# Patient Record
Sex: FEMALE | Race: WHITE | NOT HISPANIC OR LATINO | Employment: FULL TIME | URBAN - METROPOLITAN AREA
[De-identification: names, ages, dates, MRNs, and addresses within clinical notes are randomized per-mention and may not be internally consistent; named-entity substitution may affect disease eponyms.]

---

## 2023-10-10 DIAGNOSIS — Z30.41 ENCOUNTER FOR SURVEILLANCE OF CONTRACEPTIVE PILLS: Primary | ICD-10-CM

## 2023-10-10 RX ORDER — NORETHINDRONE ACETATE AND ETHINYL ESTRADIOL AND FERROUS FUMARATE 1MG-20(21)
1 KIT ORAL DAILY
Qty: 28 TABLET | Refills: 3 | Status: SHIPPED | OUTPATIENT
Start: 2023-10-10 | End: 2023-11-03

## 2023-11-02 DIAGNOSIS — Z30.41 ENCOUNTER FOR SURVEILLANCE OF CONTRACEPTIVE PILLS: ICD-10-CM

## 2023-11-03 RX ORDER — NORETHINDRONE ACETATE AND ETHINYL ESTRADIOL AND FERROUS FUMARATE 1MG-20(21)
1 KIT ORAL DAILY
Qty: 84 TABLET | Refills: 0 | Status: SHIPPED | OUTPATIENT
Start: 2023-11-03 | End: 2024-02-05

## 2024-02-01 DIAGNOSIS — Z30.41 ENCOUNTER FOR SURVEILLANCE OF CONTRACEPTIVE PILLS: ICD-10-CM

## 2024-02-05 RX ORDER — NORETHINDRONE ACETATE AND ETHINYL ESTRADIOL AND FERROUS FUMARATE 1MG-20(21)
1 KIT ORAL DAILY
Qty: 84 TABLET | Refills: 0 | Status: SHIPPED | OUTPATIENT
Start: 2024-02-05

## 2024-04-28 DIAGNOSIS — Z30.41 ENCOUNTER FOR SURVEILLANCE OF CONTRACEPTIVE PILLS: ICD-10-CM

## 2024-05-01 RX ORDER — NORETHINDRONE ACETATE AND ETHINYL ESTRADIOL AND FERROUS FUMARATE 1MG-20(21)
1 KIT ORAL DAILY
Qty: 84 TABLET | Refills: 0 | OUTPATIENT
Start: 2024-05-01

## 2024-05-28 ENCOUNTER — OFFICE VISIT (OUTPATIENT)
Dept: OBSTETRICS AND GYNECOLOGY | Facility: CLINIC | Age: 34
End: 2024-05-28
Payer: COMMERCIAL

## 2024-05-28 VITALS
HEIGHT: 66 IN | SYSTOLIC BLOOD PRESSURE: 124 MMHG | BODY MASS INDEX: 21.76 KG/M2 | WEIGHT: 135.4 LBS | DIASTOLIC BLOOD PRESSURE: 64 MMHG

## 2024-05-28 DIAGNOSIS — Z12.4 CERVICAL CANCER SCREENING: ICD-10-CM

## 2024-05-28 DIAGNOSIS — Z01.419 ENCOUNTER FOR WELL WOMAN EXAM WITH ROUTINE GYNECOLOGICAL EXAM: Primary | ICD-10-CM

## 2024-05-28 PROCEDURE — 1036F TOBACCO NON-USER: CPT | Performed by: OBSTETRICS & GYNECOLOGY

## 2024-05-28 PROCEDURE — 88175 CYTOPATH C/V AUTO FLUID REDO: CPT

## 2024-05-28 PROCEDURE — 99395 PREV VISIT EST AGE 18-39: CPT | Performed by: OBSTETRICS & GYNECOLOGY

## 2024-05-28 PROCEDURE — 87624 HPV HI-RISK TYP POOLED RSLT: CPT

## 2024-05-28 RX ORDER — ALBUTEROL SULFATE 90 UG/1
AEROSOL, METERED RESPIRATORY (INHALATION)
COMMUNITY
Start: 2020-03-27

## 2024-05-28 RX ORDER — TRIAMCINOLONE ACETONIDE 1 MG/G
1 CREAM TOPICAL 2 TIMES DAILY
COMMUNITY
Start: 2024-05-08 | End: 2024-06-07

## 2024-05-28 RX ORDER — MONTELUKAST SODIUM 10 MG/1
10 TABLET ORAL NIGHTLY
COMMUNITY

## 2024-05-28 RX ORDER — FLUTICASONE PROPIONATE AND SALMETEROL 100; 50 UG/1; UG/1
1 POWDER RESPIRATORY (INHALATION) 2 TIMES DAILY
COMMUNITY
Start: 2024-04-25

## 2024-05-28 RX ORDER — FLUTICASONE PROPIONATE 50 MCG
1 SPRAY, SUSPENSION (ML) NASAL DAILY
COMMUNITY

## 2024-05-28 RX ORDER — LORATADINE 10 MG/1
10 TABLET ORAL
COMMUNITY

## 2024-05-28 ASSESSMENT — ENCOUNTER SYMPTOMS: GASTROINTESTINAL NEGATIVE: 0

## 2024-05-28 NOTE — PROGRESS NOTES
PAP 10- NEG, HPV NEG  10-29-21 ASCUS, HPV NEG, 9-29-20 LSIL HPV NEG, COLPO 10-30-20 CAN'T R/O LGSIL,   9-24-19 NEG HPV NEG, 8-24-18 NEG HPV NEG,   2017 HPV + h/o Colpo and LEEP in 2016&2017 in [place]  MAMMO NEVER  DEXA NEVER  COLON NEVER    ASSESSMENT/PLAN    Encounter for well woman exam with routine gynecological exam    Routine well woman visit.   Your exam was normal today.   Prior h/o abnormal pap and LEEP procedure.   A pap and HPV test was done. If you are connected with the  on line system, you will be notified about your pap results through the patient portal in 2-3 weeks.          SUBJECTIVE    HPI    35 yo G0 here for a well woman visit.  Last visit 10/2022 for RA.  Recent rash. Went to PCP. Had blood work, positive CHARLENE. Has appt to see rheumatology.   Denies any other intervening medical or surgical issues.   h/o abnormal pap with procedures as above. Last pap 2022 was normal and HPV negative.  Went off of OCP's in the last year. Buzz lives in [facility] so decided to come off OC's. Buzz approved to come to the [facility] as of July 1. Plans condom use.   In last 6 months gets cycle every 1 - 1 1/2 months. Notes irregular cycles prior to OC's. We reviewed other contraceptive options. She will consider and call if she decides on a contraceptive method.   Non smoker,  no ETOH.      REVIEW OF SYSTEMS    Constitutional: no fever, no chills, no recent weight gain, no recent weight loss, no fatigue.   Eyes: no eye pain, no vision problems, no dryness of the eyes.   ENT: no hearing loss, + nosebleeds, no sinus congestion.   Cardiovascular: no chest pain, + palpitations.  Respiratory: + shortness of breath, + cough, no wheezing.   Gastrointestinal: no abdominal pain, no constipation, no nausea, no diarrhea, no vomiting.   Genitourinary: no pelvic pain, no dysuria, no urinary incontinence, no change in urinary frequency, no vaginal dryness, no vaginal itching,  no vaginal discharge, no unexplained  "vaginal bleeding, no lesion/sore.   Musculoskeletal: no back pain, no joint swelling and no leg edema.   Integumentary: no rashes, no skin lesions, no breast pain, no nipple discharge, no breast lump.   Neurological: no headache, no numbness, no dizziness.   Psychiatric: no sleep disturbances, no anxiety, no depression.   Endocrine: no hot flashes, no loss of hair, no hirsutism.   Hematologic/Lymphatic: no swollen glands, no tendency for easy bleeding,  no tendency for easy bruising.      OBJECTIVE    /64   Ht 1.676 m (5' 6\")   Wt 61.4 kg (135 lb 6.4 oz)   LMP 05/03/2024 (Exact Date)   BMI 21.85 kg/m²      Physical Exam     Constitutional: Alert and in no acute distress. Well developed, well nourished   Head and Face: Head and face: normal   Eyes: Normal external exam - nonicteric sclera, extraocular movements intact (EOMI) and no ptosis.   Ears, Nose, Mouth, and Throat: External inspection of ears and nose: normal   Neck: no neck asymmetry. Supple and thyroid not enlarged and there were no palpable thyroid nodules   Cardiovascular: Heart rate and rhythm were normal, normal S1 and S2, no gallops, and no murmurs   Pulmonary: No respiratory distress and clear bilateral breath sounds   Chest: Breasts: normal appearance, no nipple discharge and no skin changes and palpation of breasts and axillae: no palpable mass and no axillary lymphadenopathy   Abdomen: soft nontender; no abdominal mass palpated, no organomegaly and no hernias   Genitourinary: external genitalia: normal, no inguinal lymphadenopathy, Bartholin's urethral and Rodman's glands: normal, urethra: normal, bladder: normal on palpation and perianal area: normal   Vagina: normal.   Cervix: Normal appearing without lesions.  Uterus: Normal, mobile, nontender.  Right Adnexa/parametria: Normal.   Left Adnexa/parametria: Normal.   Skin: normal skin color and pigmentation, normal skin turgor, and no rash.   Psychiatric: alert and oriented x 3., affect " normal to patient baseline and mood: appropriate        Lucy Pulliam MD

## 2024-06-25 ENCOUNTER — APPOINTMENT (OUTPATIENT)
Dept: OBSTETRICS AND GYNECOLOGY | Facility: CLINIC | Age: 34
End: 2024-06-25
Payer: COMMERCIAL

## 2024-06-25 VITALS — DIASTOLIC BLOOD PRESSURE: 60 MMHG | SYSTOLIC BLOOD PRESSURE: 100 MMHG | BODY MASS INDEX: 21.31 KG/M2 | WEIGHT: 132 LBS

## 2024-06-25 DIAGNOSIS — Z32.02 PREGNANCY TEST NEGATIVE: ICD-10-CM

## 2024-06-25 DIAGNOSIS — N84.1 CERVICAL POLYP: ICD-10-CM

## 2024-06-25 DIAGNOSIS — R87.612 LGSIL ON PAP SMEAR OF CERVIX: Primary | ICD-10-CM

## 2024-06-25 LAB — PREGNANCY TEST URINE, POC: NEGATIVE

## 2024-06-25 PROCEDURE — 88305 TISSUE EXAM BY PATHOLOGIST: CPT

## 2024-06-25 PROCEDURE — 81025 URINE PREGNANCY TEST: CPT | Performed by: OBSTETRICS & GYNECOLOGY

## 2024-06-25 PROCEDURE — 57454 BX/CURETT OF CERVIX W/SCOPE: CPT | Performed by: OBSTETRICS & GYNECOLOGY

## 2024-06-25 RX ORDER — FLUTICASONE PROPIONATE AND SALMETEROL 250; 50 UG/1; UG/1
POWDER RESPIRATORY (INHALATION)
COMMUNITY
Start: 2024-06-17

## 2024-06-25 RX ORDER — HYDROXYCHLOROQUINE SULFATE 200 MG/1
300 TABLET, FILM COATED ORAL
COMMUNITY
Start: 2024-06-20 | End: 2024-12-17

## 2024-06-25 NOTE — PROGRESS NOTES
PAP  5- LGSIL, HPV NEG  10- NEG, HPV NEG, 10-29-21 ASCUS, HPV NEG, 9-29-20 LSIL HPV NEG, COLPO 10-30-20 CAN'T R/O LGSIL,   9-24-19 NEG HPV NEG, 8-24-18 NEG HPV NEG,   2017 HPV + h/o Colpo and LEEP in 2016&2017 in [place]      IMP/PLAN    LGSIL on Pap smear of cervix  Cervical polyp  - Colposcopy, benign findings  - polyp removal  - endocervical curettage  - results by phone.        SUBJECTIVE  33 yo here for colposcopy.  Previous LEEP and abnormal paps as above.  Last pap 5/2024 LGSIL, HPV negative.    Colposcopy    Date/Time: 6/25/2024 10:08 AM    Performed by: Lucy Pulliam MD  Authorized by: Lucy Pulliam MD    Consent:     Patient questions answered: yes      Risks and benefits of the procedure and its alternatives discussed: yes      Procedural risks discussed:  Bleeding, infection and possible continued pain    Consent obtained:  Written    Consent given by:  Patient  Pre-procedure:     Speculum was placed in the vagina: yes      Prep solution(s): acetic acid    Procedure:     Colposcopy with: cervical biopsy and endocervical curettage      Cervix visibility: fully visualized      SCJ visibility: fully visualized      Lesion visualized: fully visualized    Post-procedure:     Patient tolerance of procedure:  Patient tolerated the procedure well with no immediate complications    Instructions and paperwork completed: yes      Educational handouts given: yes    Comments:      .cervix     Normal ectocervix, no AW lesions. Small endocervical polyp on a stalk protruding through cervical os.     Cervical polyp removed.  Endocervical curettage done.

## 2024-06-25 NOTE — PATIENT INSTRUCTIONS
Visit for Colposcopy:  You were seen today for colposcopy for abnormal pap smear  Colposcopy is a type of examination that allows for better visualization of abnormal cervical cells, generally caused by infection with Human Papilloma Virus (HPV)  You may have had a biopsy done to better diagnose cervical abnormalities and plan for future management. If so, you may have some spotting to light vaginal bleeding for a few days. Sometimes a medication called Monsel's is used after cervical biopsies to stop bleeding, and this medication often causes a discharge that looks like coffee grounds  If a biopsy was done during your colposcopy you will receive your results by phone/MyChart  Recommendations for follow up pap smears/treatment is dependent on colposcopy/biopsy findings  Monitor for signs of infection and call the office for any fever, chills, severe/worsening pelvic pain, foul smelling vaginal discharge. [phone] [place] [phone]

## 2024-07-09 LAB
LABORATORY COMMENT REPORT: NORMAL
PATH REPORT.FINAL DX SPEC: NORMAL
PATH REPORT.GROSS SPEC: NORMAL
PATH REPORT.RELEVANT HX SPEC: NORMAL
PATH REPORT.TOTAL CANCER: NORMAL

## 2024-10-18 ENCOUNTER — OFFICE VISIT (OUTPATIENT)
Dept: URGENT CARE | Age: 34
End: 2024-10-18
Payer: COMMERCIAL

## 2024-10-18 VITALS
HEART RATE: 68 BPM | RESPIRATION RATE: 18 BRPM | SYSTOLIC BLOOD PRESSURE: 109 MMHG | OXYGEN SATURATION: 98 % | TEMPERATURE: 98.2 F | WEIGHT: 136.8 LBS | DIASTOLIC BLOOD PRESSURE: 71 MMHG | BODY MASS INDEX: 22.08 KG/M2

## 2024-10-18 DIAGNOSIS — R30.0 DYSURIA: Primary | ICD-10-CM

## 2024-10-18 DIAGNOSIS — N30.01 ACUTE CYSTITIS WITH HEMATURIA: ICD-10-CM

## 2024-10-18 LAB
POC APPEARANCE, URINE: ABNORMAL
POC BILIRUBIN, URINE: NEGATIVE
POC BLOOD, URINE: ABNORMAL
POC COLOR, URINE: YELLOW
POC GLUCOSE, URINE: NEGATIVE MG/DL
POC KETONES, URINE: NEGATIVE MG/DL
POC LEUKOCYTES, URINE: ABNORMAL
POC NITRITE,URINE: NEGATIVE
POC PH, URINE: 6 PH
POC PROTEIN, URINE: ABNORMAL MG/DL
POC SPECIFIC GRAVITY, URINE: <=1.005
POC UROBILINOGEN, URINE: 0.2 EU/DL
PREGNANCY TEST URINE, POC: NEGATIVE

## 2024-10-18 PROCEDURE — 87086 URINE CULTURE/COLONY COUNT: CPT

## 2024-10-18 PROCEDURE — 87077 CULTURE AEROBIC IDENTIFY: CPT

## 2024-10-18 RX ORDER — CEPHALEXIN 500 MG/1
500 CAPSULE ORAL 2 TIMES DAILY
Qty: 14 CAPSULE | Refills: 0 | Status: SHIPPED | OUTPATIENT
Start: 2024-10-18 | End: 2024-10-18

## 2024-10-18 RX ORDER — CEPHALEXIN 500 MG/1
500 CAPSULE ORAL 2 TIMES DAILY
Qty: 14 CAPSULE | Refills: 0 | Status: SHIPPED | OUTPATIENT
Start: 2024-10-18 | End: 2024-10-25

## 2024-10-18 NOTE — PROGRESS NOTES
Subjective   Patient ID: Olesya Underwood is a 34 y.o. female. They present today with a chief complaint of Other (UTI).    History of Present Illness  HPI  Pt is a 34 yr old female who presents with burning with urination and pressure.  It started last night and got worse today.  She denies fever or chills, no n/v/d.  Past Medical History  Allergies as of 10/18/2024 - Reviewed 10/18/2024   Allergen Reaction Noted    Shrimp Hives 01/08/2020       (Not in a hospital admission)         Past Medical History:   Diagnosis Date    Connective tissue disease (Multi) 05/2024    Encounter for pregnancy test, result unknown     Encounter for pregnancy test    Personal history of other diseases of the respiratory system     History of asthma    Personal history of other specified conditions     History of abnormal Pap smear    Varicella without complication     Varicella without complication       Past Surgical History:   Procedure Laterality Date    CERVICAL BIOPSY  W/ LOOP ELECTRODE EXCISION  08/24/2018    Cervical Loop Electrosurgical Excision (LEEP)    COLPOSCOPY  08/24/2018    Colposcopy    MOUTH SURGERY  08/24/2018    Oral Surgery Tooth Extraction    SKIN BIOPSY  05/01/2024        reports that she has never smoked. She has never used smokeless tobacco. She reports that she does not currently use alcohol. She reports that she does not use drugs.    Review of Systems  Review of Systems  Gen: No fatigue, fever, sweats.  Head: No headache, trauma.  Eyes: No vision loss, double vision, drainage, eye pain.  ENT: No hearing changes, pain, epistaxis, congestion  Cardiac: No chest pain  Pulmonary: No shortness of breath,  pleuritic pain,   Heme/lymph: No swollen glands  GI: No abdominal pain, nausea, vomiting, diarrhea  : + dysuria, +frequency, urgency, hematuria  Musculoskeletal: No limb pain, joint pain, back pain, joint swelling or stiffness.  Skin: No rashes, pruritus, lumps, lesions.  Neuro: No Numbness, tingling, or  weakness.  Psych: No  anxiety     Review of systems is otherwise negative unless stated above or in history of present illness.                             Objective    Vitals:    10/18/24 1938   BP: 109/71   BP Location: Left arm   Patient Position: Sitting   BP Cuff Size: Adult   Pulse: 68   Resp: 18   Temp: 36.8 °C (98.2 °F)   TempSrc: Oral   SpO2: 98%   Weight: 62.1 kg (136 lb 12.8 oz)     No LMP recorded.    Physical Exam  General: Vital signs stable, Pt is alert, no acute distress  Eyes: Conjunctiva normal, PERRL, EOMs intact  HENMT: Normocephalic, atraumatic, external ears and nose normal, no scars or masses.  No mastoid tenderness. Trachea is midline. No meningeal signs, negative Kernig and Brudzinski, moves neck freely.  No sinus tenderness  Resp: Respiratory effort is normal, no retractions, no stridor. Lungs CTA, no wheezes or rhonchi  CV: Heart is regular rate and rhythm.   Skin: No evidence of trauma, skin is warm and dry. No rashes, lesions or ulcers.  Skel: full range of motion of upper and lower extremities.   Neuro: Normal gait, CN II-XII intact, no motor or sensory changes.  Psych: Alert and oriented ×3, judgment is appropriate, normal mood and affect     Procedures    Point of Care Test & Imaging Results from this visit  Results for orders placed or performed in visit on 10/18/24   POCT UA Automated manually resulted    Collection Time: 10/18/24  7:50 PM   Result Value Ref Range    POC Color, Urine Yellow Straw, Yellow, Light-Yellow    POC Appearance, Urine Cloudy (A) Clear    POC Glucose, Urine NEGATIVE NEGATIVE mg/dl    POC Bilirubin, Urine NEGATIVE NEGATIVE    POC Ketones, Urine NEGATIVE NEGATIVE mg/dl    POC Specific Gravity, Urine <=1.005 1.005 - 1.035    POC Blood, Urine LARGE (3+) (A) NEGATIVE    POC PH, Urine 6.0 No Reference Range Established PH    POC Protein, Urine 15 (1+) (A) NEGATIVE, 30 (1+) mg/dl    POC Urobilinogen, Urine 0.2 0.2, 1.0 EU/DL    Poc Nitrite, Urine NEGATIVE NEGATIVE     POC Leukocytes, Urine LARGE (3+) (A) NEGATIVE   POCT pregnancy, urine manually resulted    Collection Time: 10/18/24  7:50 PM   Result Value Ref Range    Preg Test, Ur Negative Negative     No results found.    Diagnostic study results (if any) were reviewed by EDMOND Potter.    Assessment/Plan   Allergies, medications, history, and pertinent labs/EKGs/Imaging reviewed by EDMOND Potter.     Medical Decision Making  History and physical, Pt appears to have an uncomplicated UTI based on history and exam. No signs of pyelonephritis, sepsis, systemic illness, or vaginitis. Pt will be treated with antibiotics and contacted if urine culture demonstrates resistance to antibiotic selected for treatment. Follow up and return precautions discussed with patient prior to discharge.      Orders and Diagnoses  Diagnoses and all orders for this visit:  Dysuria  -     POCT UA Automated manually resulted  -     POCT pregnancy, urine manually resulted  Acute cystitis with hematuria  -     Urine Culture        Medical Admin Record      Follow Up Instructions  No follow-ups on file.Your urine was sent to the lab for culture and sensitivity. You will be notified if your antibiotic needs to be changed based on sensitivity results. Take medications as directed. Take with food, yogurt, or a probiotic. I recommend taking a probiotic while taking this medication, and for 7 days after you finish it.    A probiotic is a supplement you can buy over-the-counter that helps support the good bacteria in your body while taking antibiotics. Probiotics help to avoid the side effects of antibiotics, such as diarrhea or yeast infections. It is best to take a probiotic about 2 hours after your dose of antibiotic. Drink plenty of fluids, or sugar-free cranberry juice. Follow-up with primary care doctor in 3-5 days if symptoms persist. If for severe or worsening symptoms, please go to the ER    Patient disposition:      Electronically signed by Candelaria Stephens, JOSE-CNP  7:58 PM

## 2024-10-20 LAB — BACTERIA UR CULT: ABNORMAL

## 2024-10-21 ENCOUNTER — TELEPHONE (OUTPATIENT)
Dept: URGENT CARE | Age: 34
End: 2024-10-21

## 2024-10-21 DIAGNOSIS — N30.00 ACUTE CYSTITIS WITHOUT HEMATURIA: Primary | ICD-10-CM

## 2024-10-21 LAB — BACTERIA UR CULT: ABNORMAL

## 2024-10-21 RX ORDER — SULFAMETHOXAZOLE AND TRIMETHOPRIM 800; 160 MG/1; MG/1
1 TABLET ORAL 2 TIMES DAILY
Qty: 14 TABLET | Refills: 0 | Status: SHIPPED | OUTPATIENT
Start: 2024-10-21 | End: 2024-10-28